# Patient Record
Sex: MALE | Race: OTHER | HISPANIC OR LATINO | Employment: UNEMPLOYED | URBAN - METROPOLITAN AREA
[De-identification: names, ages, dates, MRNs, and addresses within clinical notes are randomized per-mention and may not be internally consistent; named-entity substitution may affect disease eponyms.]

---

## 2024-08-07 ENCOUNTER — HOSPITAL ENCOUNTER (EMERGENCY)
Facility: HOSPITAL | Age: 24
Discharge: HOME | End: 2024-08-07
Attending: EMERGENCY MEDICINE

## 2024-08-07 VITALS
TEMPERATURE: 99 F | HEART RATE: 90 BPM | HEIGHT: 70 IN | SYSTOLIC BLOOD PRESSURE: 139 MMHG | RESPIRATION RATE: 20 BRPM | DIASTOLIC BLOOD PRESSURE: 92 MMHG | BODY MASS INDEX: 32.36 KG/M2 | WEIGHT: 226.06 LBS | OXYGEN SATURATION: 100 %

## 2024-08-07 DIAGNOSIS — S00.01XA SCALP ABRASION, INITIAL ENCOUNTER: Primary | ICD-10-CM

## 2024-08-07 DIAGNOSIS — S50.01XA CONTUSION OF RIGHT ELBOW, INITIAL ENCOUNTER: ICD-10-CM

## 2024-08-07 DIAGNOSIS — S59.901A ELBOW INJURY, RIGHT, INITIAL ENCOUNTER: ICD-10-CM

## 2024-08-07 DIAGNOSIS — S01.311A: ICD-10-CM

## 2024-08-07 PROCEDURE — 99285 EMERGENCY DEPT VISIT HI MDM: CPT | Mod: 25

## 2024-08-07 PROCEDURE — 25000003 PHARM REV CODE 250: Performed by: EMERGENCY MEDICINE

## 2024-08-07 PROCEDURE — 90471 IMMUNIZATION ADMIN: CPT | Performed by: EMERGENCY MEDICINE

## 2024-08-07 PROCEDURE — 90715 TDAP VACCINE 7 YRS/> IM: CPT | Performed by: EMERGENCY MEDICINE

## 2024-08-07 PROCEDURE — 12011 RPR F/E/E/N/L/M 2.5 CM/<: CPT

## 2024-08-07 PROCEDURE — 63600175 PHARM REV CODE 636 W HCPCS: Performed by: EMERGENCY MEDICINE

## 2024-08-07 RX ORDER — OXYCODONE AND ACETAMINOPHEN 5; 325 MG/1; MG/1
1 TABLET ORAL
Status: COMPLETED | OUTPATIENT
Start: 2024-08-07 | End: 2024-08-07

## 2024-08-07 RX ORDER — BACITRACIN ZINC 500 UNIT/G
OINTMENT (GRAM) TOPICAL 2 TIMES DAILY
Qty: 30 G | Refills: 0 | Status: SHIPPED | OUTPATIENT
Start: 2024-08-07

## 2024-08-07 RX ORDER — IBUPROFEN 600 MG/1
600 TABLET ORAL EVERY 6 HOURS PRN
Qty: 20 TABLET | Refills: 0 | Status: SHIPPED | OUTPATIENT
Start: 2024-08-07

## 2024-08-07 RX ORDER — HYDROCODONE BITARTRATE AND ACETAMINOPHEN 5; 325 MG/1; MG/1
1 TABLET ORAL EVERY 8 HOURS PRN
Qty: 10 TABLET | Refills: 0 | Status: SHIPPED | OUTPATIENT
Start: 2024-08-07

## 2024-08-07 RX ORDER — LIDOCAINE HYDROCHLORIDE 10 MG/ML
10 INJECTION, SOLUTION INFILTRATION; PERINEURAL
Status: COMPLETED | OUTPATIENT
Start: 2024-08-07 | End: 2024-08-07

## 2024-08-07 RX ADMIN — BACITRACIN ZINC, NEOMYCIN, POLYMYXIN B 1 EACH: 400; 3.5; 5 OINTMENT TOPICAL at 02:08

## 2024-08-07 RX ADMIN — OXYCODONE HYDROCHLORIDE AND ACETAMINOPHEN 1 TABLET: 5; 325 TABLET ORAL at 12:08

## 2024-08-07 RX ADMIN — TETANUS TOXOID, REDUCED DIPHTHERIA TOXOID AND ACELLULAR PERTUSSIS VACCINE, ADSORBED 0.5 ML: 5; 2.5; 8; 8; 2.5 SUSPENSION INTRAMUSCULAR at 12:08

## 2024-08-07 RX ADMIN — LIDOCAINE HYDROCHLORIDE 10 ML: 10 INJECTION, SOLUTION INFILTRATION; PERINEURAL at 12:08

## 2024-08-07 NOTE — Clinical Note
"Ty Hinton"Romeo Atkins was seen and treated in our emergency department on 8/7/2024.  He may return to work on 08/12/2024.       If you have any questions or concerns, please don't hesitate to call.      China Babb MD"

## 2024-08-07 NOTE — DISCHARGE INSTRUCTIONS
CT scan x-rays do not show serious injury.  Use ibuprofen as needed for pain.  Use Norco as needed for severe pain not improved by ibuprofen.  Do not drive or operate heavy machinery when taking Norco as it can cause drowsiness and decrease coordination.  Return to the emergency department for sign of infection or any new, worsening or significantly concerning symptoms.  You should have your stitches removed within 5-7 days.  Follow up with ENT for further evaluation of your ear wounds.  Follow up with a primary care physician for routine health maintenance.    Thank you for coming to our Emergency Department today. It is important to remember that some problems are difficult to diagnose and may not be found during your first visit. Be sure to follow up with your primary care doctor and review any labs/imaging that was performed with them. If you do not have a primary care doctor, you may contact the one listed on your discharge paperwork or you may also call the [facility] Appointment Desk at [phone] to schedule an appointment with one.     All medications may potentially have side effects and it is impossible to predict which medications may give you side effects. If you feel that you are having a negative effect of any medication you should immediately stop taking them and seek medical attention.    Return to the ER with any questions/concerns, new/concerning symptoms, worsening or failure to improve. Do not drive or make any important decisions for 24 hours if you have received any pain medications, sedatives or mood altering drugs during your ER visit.

## 2024-08-08 NOTE — ED PROVIDER NOTES
Encounter Date: 8/7/2024       History     Chief Complaint   Patient presents with    Head Injury     Pt c/o pain to head and right arm s/p fall from a forklift. Pt has an abrasion to top of head, swelling to side of right side of head, and a laceration to right ear. Pt has limited ROM to right arm. Pt denied LOC or blood thinners. Pt stated he fell 10-12 feet.      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  Patient reports he sustained a fall of approximately 10 ft in the course of the accident as well as an injury to his head and right arm.  He denies loss of consciousness, numbness or weakness. He denies chest, abdominal or back pain.  He has been ambulatory since accident.  Denies any symptoms preceding accident.  No treatments attempted.      Review of patient's allergies indicates:  No Known Allergies  History reviewed. No pertinent past medical history.  History reviewed. No pertinent surgical history.  No family history on file.  Social History     Tobacco Use    Smoking status: Never    Smokeless tobacco: Never   Substance Use Topics    Alcohol use: Not Currently    Drug use: Never     Review of Systems   Constitutional:  Negative for fever.   HENT:  Negative for facial swelling and sore throat.    Eyes:  Negative for pain, discharge and visual disturbance.   Respiratory:  Negative for choking and shortness of breath.    Cardiovascular:  Negative for chest pain.   Gastrointestinal:  Negative for abdominal pain, nausea and vomiting.   Genitourinary:  Negative for dysuria and frequency.   Musculoskeletal:  Positive for arthralgias (right elbow). Negative for back pain, neck pain and neck stiffness.   Skin:  Negative for rash.   Neurological:  Negative for weakness, numbness and headaches.       Physical Exam     Initial Vitals [08/07/24 1048]   BP Pulse Resp Temp SpO2   (!) 140/94 92 18 99 °F (37.2 °C) 98 %      MAP       --          Physical Exam    Nursing note and vitals reviewed.  Constitutional: He is not diaphoretic. No distress.   HENT:   Head: Normocephalic.   Protecting airway  + superficial linear abrasion to the top of scalp without active bleeding  + 2 cm laceration to the superior aspect of the right ear with active bleeding   No external auditory canal involvement   Eyes: Conjunctivae and EOM are normal. No scleral icterus.   Neck: Neck supple. No tracheal deviation present.   No midline C-spine tenderness or step-offs   Normal range of motion.  Cardiovascular:  Normal rate, regular rhythm and intact distal pulses.           Pulmonary/Chest: No stridor. No respiratory distress. He has no wheezes. He has no rales.   Speaking in full sentences  No chest wall contusions or ecchymoses   Abdominal: Abdomen is soft. He exhibits no distension and no mass. There is no abdominal tenderness.   No ecchymoses There is no rebound and no guarding.   Musculoskeletal:      Cervical back: Normal range of motion and neck supple.      Comments: Mild right elbow edema and tenderness  No wounds  + pain with range of motion     Neurological: He is alert. He has normal strength. No cranial nerve deficit or sensory deficit. GCS score is 15. GCS eye subscore is 4. GCS verbal subscore is 5. GCS motor subscore is 6.   Skin: Skin is warm and dry.   Psychiatric: He has a normal mood and affect.         ED Course   Lac Repair    Date/Time: 8/7/2024 2:33 PM    Performed by: China Babb MD  Authorized by: China Babb MD    Consent:     Consent obtained:  Verbal    Consent given by:  Patient    Risks, benefits, and alternatives were discussed: yes      Risks discussed:  Infection and poor cosmetic result  Universal protocol:     Patient identity confirmed:  Verbally with patient and provided demographic data  Anesthesia:     Anesthesia method:  Nerve block    Block location:  Rigth auricular    Block needle gauge:  25 G    Block anesthetic:   Lidocaine 1% w/o epi    Block injection procedure:  Anatomic landmarks identified and negative aspiration for blood    Block outcome:  Anesthesia achieved  Laceration details:     Location:  Ear    Ear location:  R ear    Length (cm):  2  Pre-procedure details:     Preparation:  Patient was prepped and draped in usual sterile fashion and imaging obtained to evaluate for foreign bodies  Exploration:     Hemostasis achieved with:  Direct pressure    Imaging outcome: foreign body not noted    Treatment:     Area cleansed with:  Saline    Amount of cleaning:  Standard    Irrigation solution:  Sterile saline    Irrigation method:  Pressure wash    Debridement:  None    Undermining:  None  Skin repair:     Repair method:  Sutures    Suture size:  5-0    Suture material:  Prolene    Suture technique:  Simple interrupted    Number of sutures:  7  Approximation:     Approximation:  Loose  Repair type:     Repair type:  Intermediate  Post-procedure details:     Dressing:  Sterile dressing    Procedure completion:  Tolerated    Labs Reviewed - No data to display       Imaging Results              X-Ray Elbow Complete Right (Final result)  Result time 08/07/24 12:59:27      Final result by Lino Munoz MD (08/07/24 12:59:27)                   Impression:      No acute displaced fracture-dislocation identified.      Electronically signed by: Lino Munoz MD  Date:    08/07/2024  Time:    12:59               Narrative:    EXAMINATION:  XR ELBOW COMPLETE 3 VIEW RIGHT    CLINICAL HISTORY:  . Unspecified injury of right elbow, initial encounter    TECHNIQUE:  AP, lateral, and oblique views of the right elbow were performed.    COMPARISON:  None    FINDINGS:  Bones are well mineralized. Overall alignment is within normal limits. No displaced fracture, dislocation or destructive osseous process.  No large elbow joint effusion.  Joint spaces appear relatively maintained. No subcutaneous emphysema or radiopaque foreign body.                                        CT Head Without Contrast (Final result)  Result time 08/07/24 11:50:32      Final result by Jose Lester MD (08/07/24 11:50:32)                   Impression:      1. No acute intracranial findings.  2. No acute cervical spine fracture.  3. Degenerative findings in the cervical spine, as discussed.      Electronically signed by: Jose Lester  Date:    08/07/2024  Time:    11:50               Narrative:    EXAMINATION:  CT HEAD WITHOUT CONTRAST; CT CERVICAL SPINE WITHOUT CONTRAST    CLINICAL HISTORY:  Facial trauma, blunt;; Neck trauma, dangerous injury mechanism (Age 16-64y);    TECHNIQUE:  Low dose axial images were obtained through the head and cervical spine.  Coronal and sagittal reformations were also performed. Contrast was not administered.    COMPARISON:  None.    FINDINGS:  Head:    Blood: No acute intracranial hemorrhage.    Parenchyma: No definite loss of gray-white differentiation to suggest acute or subacute transcortical infarct.    Ventricles/Extra-axial spaces: No abnormal extra-axial fluid collection. Basal cisterns are patent.    Vessels: Question minimal atherosclerotic calcification, which would be considered advanced for age.    Orbits: Unremarkable.    Scalp: Unremarkable.    Skull: There are no depressed skull fractures or destructive bone lesions.    Sinuses and mastoids: Essentially clear.    Other findings: None    Cervical spine:    Fractures: No acute fractures    Alignment: There is no significant vertebral subluxation  Atlanto-axial and atlanto-occipital joints: Atlanto-axial and atlanto-occipital intervals are not widened.  Facet joints: There is no traumatic facet joint widening.  Vertebral bodies: Degenerative endplate change most advanced at C5-6.  Discs: Degenerative disc disease.  Spinal canal and foraminal narrowing: Although CT does not optimally evaluate the soft tissue contents of the spinal canal and foramina, no critical stenosis is  suggested.    At C2-3, mild central spinal canal stenosis.    At C3-4, moderate central spinal canal stenosis    At C4-5, moderate central spinal canal stenosis eccentric towards the right.  Suspect at least mild right foraminal narrowing.    At C5-6, moderate central spinal canal stenosis eccentric towards the left.  Suspected least mild left foraminal narrowing.    At C6-7, mild to moderate central spinal canal stenosis  Paraspinal soft tissues: Unremarkable.    Upper Lungs:Clear                                       CT Cervical Spine Without Contrast (Final result)  Result time 08/07/24 11:50:32      Final result by Jose Lester MD (08/07/24 11:50:32)                   Impression:      1. No acute intracranial findings.  2. No acute cervical spine fracture.  3. Degenerative findings in the cervical spine, as discussed.      Electronically signed by: Jose Lester  Date:    08/07/2024  Time:    11:50               Narrative:    EXAMINATION:  CT HEAD WITHOUT CONTRAST; CT CERVICAL SPINE WITHOUT CONTRAST    CLINICAL HISTORY:  Facial trauma, blunt;; Neck trauma, dangerous injury mechanism (Age 16-64y);    TECHNIQUE:  Low dose axial images were obtained through the head and cervical spine.  Coronal and sagittal reformations were also performed. Contrast was not administered.    COMPARISON:  None.    FINDINGS:  Head:    Blood: No acute intracranial hemorrhage.    Parenchyma: No definite loss of gray-white differentiation to suggest acute or subacute transcortical infarct.    Ventricles/Extra-axial spaces: No abnormal extra-axial fluid collection. Basal cisterns are patent.    Vessels: Question minimal atherosclerotic calcification, which would be considered advanced for age.    Orbits: Unremarkable.    Scalp: Unremarkable.    Skull: There are no depressed skull fractures or destructive bone lesions.    Sinuses and mastoids: Essentially clear.    Other findings: None    Cervical spine:    Fractures: No acute  fractures    Alignment: There is no significant vertebral subluxation  Atlanto-axial and atlanto-occipital joints: Atlanto-axial and atlanto-occipital intervals are not widened.  Facet joints: There is no traumatic facet joint widening.  Vertebral bodies: Degenerative endplate change most advanced at C5-6.  Discs: Degenerative disc disease.  Spinal canal and foraminal narrowing: Although CT does not optimally evaluate the soft tissue contents of the spinal canal and foramina, no critical stenosis is suggested.    At C2-3, mild central spinal canal stenosis.    At C3-4, moderate central spinal canal stenosis    At C4-5, moderate central spinal canal stenosis eccentric towards the right.  Suspect at least mild right foraminal narrowing.    At C5-6, moderate central spinal canal stenosis eccentric towards the left.  Suspected least mild left foraminal narrowing.    At C6-7, mild to moderate central spinal canal stenosis  Paraspinal soft tissues: Unremarkable.    Upper Lungs:Clear                                       Medications   LIDOcaine HCL 10 mg/ml (1%) injection 10 mL (10 mLs Infiltration Given by Other 8/7/24 1230)   Tdap (BOOSTRIX) vaccine injection 0.5 mL (0.5 mLs Intramuscular Given 8/7/24 1227)   oxyCODONE-acetaminophen 5-325 mg per tablet 1 tablet (1 tablet Oral Given 8/7/24 1225)   neomycin-bacitracnZn-polymyxnB packet 1 each (1 each Topical (Top) Given 8/7/24 1445)     Medical Decision Making  Differential diagnosis includes but not limited to: Contusion, intracranial injury, fracture, dislocation, low clinical suspicion of thoracic or intra-abdominal injury given exam findings    Patient is afebrile and in no acute distress at time history and physical.  He has a GCS of 15.  He has no midline vertebral tenderness or step-offs.  He has full symmetrical strength and sensation bilateral upper and lower extremities.  He has a benign abdominal exam without ecchymoses or contusions or tenderness.  CT brain  without acute intracranial injury.  CT cervical spine without acute traumatic injury.  There are some degenerative changes.  X-ray of the elbow without obvious fracture or dislocation.  Patient's wounds irrigated and closed.  Ear laceration does not appear to have cartilage involvement.  Patient tolerated wound closure.  He has been referred to ENT to follow-up wound healing.  Counseled to return to the emergency department for wound check or sign of infection.  Referred to primary physician for follow-up counseled on supportive care, appropriate medication usage, concerning symptoms for which to return to ER and the importance of follow up. Understanding and agreement with treatment plan was expressed.     Amount and/or Complexity of Data Reviewed  Radiology: ordered.    Risk  OTC drugs.  Prescription drug management.                                      Clinical Impression:  Final diagnoses:  [S59.901A] Elbow injury, right, initial encounter  [S00.01XA] Scalp abrasion, initial encounter (Primary)  [S01.311A] Laceration of right ear region, initial encounter  [S50.01XA] Contusion of right elbow, initial encounter          ED Disposition Condition    Discharge Stable          ED Prescriptions       Medication Sig Dispense Start Date End Date Auth. Provider    bacitracin 500 unit/gram Oint Apply topically 2 (two) times daily. 30 g 8/7/2024 -- China Babb MD    ibuprofen (ADVIL,MOTRIN) 600 MG tablet Take 1 tablet (600 mg total) by mouth every 6 (six) hours as needed. Take with food to prevent upset stomach 20 tablet 8/7/2024 -- China Babb MD    HYDROcodone-acetaminophen (NORCO) 5-325 mg per tablet Take 1 tablet by mouth every 8 (eight) hours as needed (Severe pain not improved by other medication). 10 tablet 8/7/2024 -- China Babb MD          Follow-up Information       Follow up With Specialties Details Why Contact Info    Mayra Martinez MD Otolaryngology Schedule an appointment as soon as  possible for a visit  For ear evaluation [address] [place] [address]  [phone]      [facility] -  Schedule an appointment as soon as possible for a visit  for routine check up [address] [place] [address]  [phone]      [facility] Emergency Dept Emergency Medicine  for wound check [address] [facility] [place] [id number] [phone]             China Babb MD  08/08/24 0706

## 2024-10-17 ENCOUNTER — OFFICE VISIT (OUTPATIENT)
Dept: URGENT CARE | Facility: CLINIC | Age: 24
End: 2024-10-17
Payer: OTHER MISCELLANEOUS

## 2024-10-17 VITALS
RESPIRATION RATE: 19 BRPM | HEIGHT: 69 IN | HEART RATE: 57 BPM | BODY MASS INDEX: 32.14 KG/M2 | WEIGHT: 217 LBS | DIASTOLIC BLOOD PRESSURE: 79 MMHG | SYSTOLIC BLOOD PRESSURE: 122 MMHG | OXYGEN SATURATION: 99 %

## 2024-10-17 DIAGNOSIS — S05.92XA LEFT EYE INJURY, INITIAL ENCOUNTER: Primary | ICD-10-CM

## 2024-10-17 DIAGNOSIS — T15.92XA FOREIGN BODY OF LEFT EYE, INITIAL ENCOUNTER: ICD-10-CM

## 2024-10-17 DIAGNOSIS — Z02.6 ENCOUNTER RELATED TO WORKER'S COMPENSATION CLAIM: ICD-10-CM

## 2024-10-17 LAB
CTP QC/QA: YES
POC 10 PANEL DRUG SCREEN: NEGATIVE

## 2024-10-17 NOTE — LETTER
[facility]  [address] [address]  Phone: [phone]  Fax: [phone]  [facility]: [email or web address]    Pt Name: Ty Atkins  Injury Date: 10/17/2024   Employee ID: [id number] Date of First Treatment: 10/17/2024   Company: [employer]      Appointment Time:  Arrived: 10:50 AM    Provider: Melinda Roger MD Time Out:12:38 PM      Office Treatment:   1. Left eye injury, initial encounter    2. Encounter related to worker's compensation claim          Patient Instructions: Attention not to aggravate affected area (Use artifical tears as needed;   Atención a no agravar la esther afectada. Utilice lágrimas artificiales según sea necesario.)      Restrictions: Regular Duty (deber regular)     Return As needed. NI

## 2024-10-17 NOTE — PROGRESS NOTES
Subjective:      Patient ID: Ty Atkins is a 24 y.o. male.    Chief Complaint: Eye Injury    Patient's place of employment - [employer]  Patient's job title - [employer]  Date of injury - 10/17/2024  Body part injured including left or right- Left Eye/ under eyelid  Injury Mechanism - Debré flew into patients eye   What they were doing when they got hurt - Pt states that he was wearing his safety glasses, but when he was pulling in a chain there must have been welding residue, when the wind blew causing the dust to fly towards him getting into his left eye.   What they did immediately after - Pt went a rinsed off his face, with no relief.   Pain scale right now - 6/10  SB.      Eyes:  Positive for foreign body in eye, eye pain and eye redness. Negative for blurred vision.       See MA note above. Begin MD note:  [place]  (#[id number]) used to conduct encounter.     Ty Atkins is a 24 y.o. presenting for evaluation of left eye injury, accompanied by job rep (Viktor). He reports that he was working with a chain, wearing glasses provided by the company when welding debris was kicked up and flew up his glasses. He cleaned his face in the bathroom and splashed water but continued to have discomfort so he reported to the supervisor.   He denies any blurred vision, reports only feeling discomfort like something is bothering him on the upper side of the eye.     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 The safety glasses are basic shield that doesn't adhere to skin to prevent things from getting under it.     Objective:     Physical Exam  Vitals and nursing note reviewed.   Constitutional:       General: He is not in acute distress.     Appearance: He is not ill-appearing.   HENT:      Head: Normocephalic.   Eyes:      General: Lids are normal. Vision grossly intact. Gaze aligned appropriately. No  scleral icterus.        Left eye: Foreign body present.No discharge or hordeolum.      Extraocular Movements:      Left eye: Normal extraocular motion and no nystagmus.      Conjunctiva/sclera: Conjunctivae normal.      Right eye: Right conjunctiva is not injected. No chemosis, exudate or hemorrhage.     Left eye: Left conjunctiva is not injected. No chemosis, exudate or hemorrhage.     Pupils:      Left eye: No corneal abrasion or fluorescein uptake.      Comments: Coloboma of the left iris. There was a small speck seen on inspection of the eye, removed with flushing.    Pulmonary:      Effort: Pulmonary effort is normal. No respiratory distress.   Skin:     General: Skin is warm.      Coloration: Skin is not pale.   Neurological:      General: No focal deficit present.      Mental Status: He is alert and oriented to person, place, and time.      GCS: GCS eye subscore is 4. GCS verbal subscore is 5. GCS motor subscore is 6.      Motor: Motor function is intact.      Coordination: Coordination is intact.   Psychiatric:         Attention and Perception: Attention normal.         Mood and Affect: Mood normal.         Speech: Speech normal.         Behavior: Behavior normal. Behavior is cooperative.         Thought Content: Thought content normal.      Vision Screening    Right eye Left eye Both eyes   Without correction 20/20 20/30 20/20   With correction            Assessment:      1. Left eye injury, initial encounter    2. Encounter related to worker's compensation claim    3. Foreign body of left eye, initial encounter      Plan:     Advised to use artifical tear drops and ice to address discomfort. Proper safety goggles to protect against debris was shown. Regular duty.        Diagnoses and plan discussed with the patient, all questions and concerns were addressed prior to discharge. Follow-up as needed if any reoccurrence of symptoms related to the present condition. Plan was developed with active input from the  patient and they verbalized understanding of and agreement with the POC.   Note was dictated with voice recognition software, please excuse any grammatical errors.    I spent a total of 30 minutes on the day of the visit.  This includes face to face time and non-face to face time preparing to see the patient (eg, review of tests), obtaining and/or reviewing separately obtained history, documenting clinical information in the electronic or other health record, independently interpreting results and communicating results to the patient/family/caregiver, or care coordinator.    Patient Instructions: Attention not to aggravate affected area (Use artifical tears as needed;   Atención a no agravar la esther afectada. Utilice lágrimas artificiales según sea necesario.)   Restrictions: Regular Duty (deber regular)  Follow up if symptoms worsen or fail to improve.